# Patient Record
Sex: MALE | Race: WHITE | Employment: OTHER | URBAN - METROPOLITAN AREA
[De-identification: names, ages, dates, MRNs, and addresses within clinical notes are randomized per-mention and may not be internally consistent; named-entity substitution may affect disease eponyms.]

---

## 2023-10-05 ENCOUNTER — HOSPITAL ENCOUNTER (EMERGENCY)
Facility: HOSPITAL | Age: 68
Discharge: HOME | End: 2023-10-05
Attending: EMERGENCY MEDICINE
Payer: MEDICARE

## 2023-10-05 ENCOUNTER — OFFICE VISIT (OUTPATIENT)
Age: 68
End: 2023-10-05

## 2023-10-05 VITALS
SYSTOLIC BLOOD PRESSURE: 130 MMHG | HEIGHT: 71 IN | DIASTOLIC BLOOD PRESSURE: 80 MMHG | HEART RATE: 56 BPM | BODY MASS INDEX: 27.16 KG/M2 | RESPIRATION RATE: 18 BRPM | OXYGEN SATURATION: 100 % | WEIGHT: 194 LBS | TEMPERATURE: 98 F

## 2023-10-05 DIAGNOSIS — E11.65 HYPERGLYCEMIA DUE TO DIABETES MELLITUS (HCC): Primary | ICD-10-CM

## 2023-10-05 DIAGNOSIS — E11.65 TYPE 2 DIABETES MELLITUS WITH HYPERGLYCEMIA, WITHOUT LONG-TERM CURRENT USE OF INSULIN (HCC): Primary | ICD-10-CM

## 2023-10-05 LAB
ANION GAP BLD CALC-SCNC: 13
CA-I BLD-MCNC: 1.21 MMOL/L (ref 1.12–1.32)
CHLORIDE BLD-SCNC: 97 MMOL/L (ref 98–107)
CO2 BLD-SCNC: 27 MMOL/L
CREAT UR-MCNC: 0.58 MG/DL (ref 0.6–1.3)
GLUCOSE BLD STRIP.AUTO-MCNC: 314 MG/DL (ref 65–100)
GLUCOSE BLD STRIP.AUTO-MCNC: 435 MG/DL (ref 65–100)
GLUCOSE BLD STRIP.AUTO-MCNC: 438 MG/DL (ref 65–100)
PERFORMED BY:: ABNORMAL
PERFORMED BY:: ABNORMAL
POTASSIUM BLD-SCNC: 4.3 MMOL/L (ref 3.5–5.5)
SODIUM BLD-SCNC: 136 MMOL/L (ref 136–145)

## 2023-10-05 PROCEDURE — 80047 BASIC METABLC PNL IONIZED CA: CPT

## 2023-10-05 PROCEDURE — 6370000000 HC RX 637 (ALT 250 FOR IP): Performed by: EMERGENCY MEDICINE

## 2023-10-05 PROCEDURE — 82962 GLUCOSE BLOOD TEST: CPT

## 2023-10-05 PROCEDURE — 96374 THER/PROPH/DIAG INJ IV PUSH: CPT

## 2023-10-05 PROCEDURE — 96361 HYDRATE IV INFUSION ADD-ON: CPT

## 2023-10-05 PROCEDURE — 99284 EMERGENCY DEPT VISIT MOD MDM: CPT

## 2023-10-05 PROCEDURE — 2580000003 HC RX 258: Performed by: EMERGENCY MEDICINE

## 2023-10-05 RX ORDER — LISINOPRIL 5 MG/1
5 TABLET ORAL
COMMUNITY
Start: 2023-09-19

## 2023-10-05 RX ORDER — METFORMIN HYDROCHLORIDE 500 MG/1
TABLET, EXTENDED RELEASE ORAL
COMMUNITY
Start: 2023-09-19

## 2023-10-05 RX ORDER — 0.9 % SODIUM CHLORIDE 0.9 %
1000 INTRAVENOUS SOLUTION INTRAVENOUS
Status: COMPLETED | OUTPATIENT
Start: 2023-10-05 | End: 2023-10-05

## 2023-10-05 RX ORDER — ROSUVASTATIN CALCIUM 20 MG/1
20 TABLET, COATED ORAL
COMMUNITY
Start: 2023-09-19

## 2023-10-05 RX ADMIN — SODIUM CHLORIDE 1000 ML: 9 INJECTION, SOLUTION INTRAVENOUS at 15:07

## 2023-10-05 RX ADMIN — INSULIN HUMAN 8 UNITS: 100 INJECTION, SOLUTION PARENTERAL at 15:39

## 2023-10-05 ASSESSMENT — LIFESTYLE VARIABLES
HOW MANY STANDARD DRINKS CONTAINING ALCOHOL DO YOU HAVE ON A TYPICAL DAY: 1 OR 2
HOW OFTEN DO YOU HAVE A DRINK CONTAINING ALCOHOL: 4 OR MORE TIMES A WEEK

## 2023-10-05 ASSESSMENT — PAIN - FUNCTIONAL ASSESSMENT
PAIN_FUNCTIONAL_ASSESSMENT: NONE - DENIES PAIN
PAIN_FUNCTIONAL_ASSESSMENT: NONE - DENIES PAIN

## 2023-10-05 NOTE — ED PROVIDER NOTES
[facility] EMERGENCY DEPT  EMERGENCY DEPARTMENT HISTORY AND PHYSICAL EXAM      Date: 10/5/2023  Patient Name: Akil Le  MRN: [id number]  [address] [facility] 1955  Date of evaluation: 10/5/2023  Provider: Liss Her MD   Note Started: 2:42 PM EDT 10/5/23    HISTORY OF PRESENT ILLNESS     Chief Complaint   Patient presents with    Hyperglycemia       History Provided By: Patient    HPI: Akil Le is a 70 y.o. male presents to the emergency department complaint of asymptomatic hyperglycemia. Patient reports recent diagnosis of diabetes which she has been started on metformin. Patient states he went to his nutritionist today and they did a blood sugar that noted the glucose to be greater than 500. Patient was sent over here for the same. Patient denies any somatic complaints at this time. PAST MEDICAL HISTORY   Past Medical History:  Past Medical History:   Diagnosis Date    Diabetes mellitus ([address])        Past Surgical History:  History reviewed. No pertinent surgical history. Family History:  History reviewed. No pertinent family history. Social History:  Social History     Tobacco Use    Smoking status: Never    Smokeless tobacco: Never       Allergies:  No Known Allergies    PCP: OLLIE Rider CNP    Current Meds:   No current facility-administered medications for this encounter.      Current Outpatient Medications   Medication Sig Dispense Refill    metFORMIN (GLUCOPHAGE-XR) 500 MG extended release tablet 1 TABLET ORALLY BEFORE BREAKFAST AND EVENING MEAL 90 DAYS      rosuvastatin (CRESTOR) 20 MG tablet Take 1 tablet by mouth      lisinopril (PRINIVIL;ZESTRIL) 5 MG tablet Take 1 tablet by mouth         Social Determinants of Health:   Social Determinants of Health     Tobacco Use: Low Risk  (10/5/2023)    Patient History     Smoking Tobacco Use: Never     Smokeless Tobacco Use: Never     Passive Exposure: Not on file   Alcohol Use: Heavy Drinker (10/5/2023)    AUDIT-C

## 2023-10-05 NOTE — PROGRESS NOTES
[facility]  Diabetes Self-Management Education & Support Program    Reason for Referral: Diabetes Education  Referral Source: Marcos Michelle, [address] [facility] requested: Steve Calvin    Mr. Robinson Diamond was recently diagnosed with Type 2 Diabetes Mellitus. He was seen in my office for DSMES Intake. During the intake, this educator preform FS. Results: 506 mg/dL. Mr. Robinson Diamond was given 18 oz of water to drink. This educator contacted [address] [place] [address] director re: participants elevated FS. It was suggested that he be seen by either urgent care or emergency room to assess for acidosis and/or prevent ketoacidosis. Mr. Robinson Diamond shared that he was nervous and had never been to the ER before. This educator assisted him with calling his insurance to confirm urgent care coverage. He was told the either visit would have same co-payment. Mr. Robinson Diamond opted to go to nearest hospital. He was offered moral support and guided to [facility]. Current Medications: Metformin xr 500 mg 2x/d, Lisinopril, Rosuvastatin       Clinical Presentation  Floresita Castillo is a 70 y.o. White male referred for diabetes self-management education. Participant has Type 2 DM not on insulin for <1 year. Family history positive for diabetes. Patient reports not receiving DSMES services in the past. Most recent A1c value: See  for more results:    9/9/2023 A1C: 13.7%; LDL: 161 mg/dL. POC GLU: 506 mg/dL in office today. He was referred to nearest Urgent Care or Emergency Room. Follow up c DSMES encouraged. Amparo Ma RD on 10/5/2023 at 1:19 PM    I have personally reviewed the health record, including provider notes, laboratory data and current medications before making these care and education recommendations. The time spent in this effort is included in the total time.   Total minutes: 30

## 2023-10-05 NOTE — DISCHARGE INSTRUCTIONS
Thank you! Thank you for allowing me to care for you in the emergency department. It is my goal to provide you with excellent care. If you have not received excellent quality care, please ask to speak to the nurse manager. Please fill out the survey that will come to you by mail or email since we listen to your feedback! Below you will find a list of your tests from today's visit. Should you have any questions, please do not hesitate to call the emergency department. Labs  Recent Results (from the past 12 hour(s))   POCT Glucose    Collection Time: 10/05/23  2:33 PM   Result Value Ref Range    POC Glucose 435 (H) 65 - 100 mg/dL    Performed by: Aman Vang    POC CHEM 8    Collection Time: 10/05/23  3:10 PM   Result Value Ref Range    POC TCO2 27 MMOL/L    POC Glucose 438 (H) 65 - 100 mg/dL    POC Creatinine 0.58 (L) 0.6 - 1.3 MG/DL    eGFR, POC >60 >60 ml/min/1.73m2    POC Sodium 136 136 - 145 mmol/L    POC Potassium 4.3 3.5 - 5.5 mmol/L    POC Ionized Calcium 1.21 1.12 - 1.32 mmol/L    POC Chloride 97 (L) 98 - 107 MMOL/L    Anion Gap, POC 13     POCT Glucose    Collection Time: 10/05/23  4:31 PM   Result Value Ref Range    POC Glucose 314 (H) 65 - 100 mg/dL    Performed by: Bryant Shape        Radiologic Studies  No orders to display     ------------------------------------------------------------------------------------------------------------  The exam and treatment you received in the Emergency Department were for an urgent problem and are not intended as complete care. It is important that you follow-up with a doctor, nurse practitioner, or physician assistant to:  (1) confirm your diagnosis,  (2) re-evaluation of changes in your illness and treatment, and  (3) for ongoing care. Please take your discharge instructions with you when you go to your follow-up appointment. If you have any problem arranging a follow-up appointment, contact the Emergency Department.   If your symptoms become

## 2023-10-05 NOTE — ED TRIAGE NOTES
Patient has new diagnosis of diabetes. Was at doctors office and told his finger stick was 505. Denies being symptomatic today.  Finger stick in triage 435

## 2023-10-25 ENCOUNTER — TELEPHONE (OUTPATIENT)
Facility: CLINIC | Age: 68
End: 2023-10-25

## 2023-10-25 NOTE — TELEPHONE ENCOUNTER
----- Message from Taylor Hall sent at 10/23/2023 11:28 AM EDT -----  Subject: Message to Provider    QUESTIONS  Information for Provider? patient was trying to schedule a diabetes   consultation with George Coughlin  ---------------------------------------------------------------------------  --------------  [address]  [phone]; OK to leave message on voicemail  ---------------------------------------------------------------------------  --------------  SCRIPT ANSWERS  Relationship to Patient?  Self

## 2023-11-07 PROBLEM — N40.0 BPH (BENIGN PROSTATIC HYPERPLASIA): Status: ACTIVE | Noted: 2023-11-07

## 2023-11-08 ENCOUNTER — OFFICE VISIT (OUTPATIENT)
Age: 68
End: 2023-11-08

## 2023-11-08 DIAGNOSIS — E11.65 TYPE 2 DIABETES MELLITUS WITH HYPERGLYCEMIA, WITHOUT LONG-TERM CURRENT USE OF INSULIN (HCC): Primary | ICD-10-CM

## 2023-11-08 RX ORDER — ORAL SEMAGLUTIDE 3 MG/1
3 TABLET ORAL DAILY
COMMUNITY

## 2023-11-08 RX ORDER — GLIMEPIRIDE 2 MG/1
2 TABLET ORAL
COMMUNITY

## 2023-11-08 NOTE — PROGRESS NOTES
Skills(Q2) Blood Sugar Monitoring Score: 3.4  Low: Skills(Q8),Confidence(Q6) Reducing Risks Score: 3.0  Low: Skills(Q5),Confidence(Q3),Preparedness(Q4)  Problem Solving Score: 2.7  Low: Skills(Q6),Confidence(Q7) Healthy Coping Score: 3.7  Low: GAWDKG(E8) Being Active Score: 6.0  Low: Confidence(Q5),Preparedness(Q2)    Skills/Knowledge Questions  1. I know how to plan meals that have the best balance between carbohydrates, proteins and vegetables. 3  2. I know how my diabetes medications (pills, injectables and/or insulin) work in my body. 3  3. I know when to check my blood sugar if I want to see how my body responded to a meal. 4  4. I know when to check my blood sugars to determine if my medication or insulin doses are correct. 5  5. I know what to do to prevent a low blood sugar when I exercise (either before, during, or after). 2  6. When I am sick, I know what to do differently with my diabetes management. 2  7. I know how stress can affect my diabetes management. 2  8. When I look at my blood sugars over a given week, I can explain what my blood sugar pattern is. 2  9. I know what my target levels are for A1c, blood pressure and cholesterol. 6  Confidence Questions  1. I am confident that I can plan balanced meals and snacks. 4  2. I am confident that I can manage my stress. 5  3. I am confident that I can prevent a low blood sugar during or after exercise. 2  4. I am confident that the next time I eat out, I will be able to choose foods that best keep my blood sugars in target. 3  5. I am confident I can include exercise into my schedule. 6  6. I am confident that I can use my daily blood sugars to adjust my diet, my activity, and/or my insulin. 2  7. When something out of my normal routine happens, I am confident that I can problem-solve and keep my diabetes on track. 2  Preparedness Questions  1. Within the next month, I will begin to eat more balanced meals and snacks. 6  2.  Within the next month, I will

## 2023-11-13 ENCOUNTER — OFFICE VISIT (OUTPATIENT)
Age: 68
End: 2023-11-13
Payer: MEDICARE

## 2023-11-13 ENCOUNTER — TELEPHONE (OUTPATIENT)
Age: 68
End: 2023-11-13

## 2023-11-13 VITALS
DIASTOLIC BLOOD PRESSURE: 81 MMHG | RESPIRATION RATE: 14 BRPM | HEIGHT: 71 IN | WEIGHT: 204.2 LBS | SYSTOLIC BLOOD PRESSURE: 127 MMHG | OXYGEN SATURATION: 100 % | HEART RATE: 71 BPM | BODY MASS INDEX: 28.59 KG/M2

## 2023-11-13 DIAGNOSIS — N52.9 ERECTILE DYSFUNCTION, UNSPECIFIED ERECTILE DYSFUNCTION TYPE: ICD-10-CM

## 2023-11-13 DIAGNOSIS — R35.0 FREQUENCY OF MICTURITION: ICD-10-CM

## 2023-11-13 DIAGNOSIS — N41.9 PROSTATITIS, UNSPECIFIED PROSTATITIS TYPE: ICD-10-CM

## 2023-11-13 DIAGNOSIS — N40.1 BENIGN PROSTATIC HYPERPLASIA WITH LOWER URINARY TRACT SYMPTOMS, SYMPTOM DETAILS UNSPECIFIED: Primary | ICD-10-CM

## 2023-11-13 DIAGNOSIS — R39.12 WEAK URINARY STREAM: ICD-10-CM

## 2023-11-13 DIAGNOSIS — R33.9 URINARY RETENTION: ICD-10-CM

## 2023-11-13 LAB
BILIRUBIN, URINE, POC: NEGATIVE
BLOOD URINE, POC: NEGATIVE
GLUCOSE URINE, POC: 500
KETONES, URINE, POC: NORMAL
LEUKOCYTE ESTERASE, URINE, POC: NEGATIVE
NITRITE, URINE, POC: NEGATIVE
PH, URINE, POC: 5.5 (ref 4.6–8)
PROTEIN,URINE, POC: NEGATIVE
SPECIFIC GRAVITY, URINE, POC: 1.02 (ref 1–1.03)
URINALYSIS CLARITY, POC: CLEAR
URINALYSIS COLOR, POC: YELLOW
UROBILINOGEN, POC: NORMAL

## 2023-11-13 PROCEDURE — 51798 US URINE CAPACITY MEASURE: CPT | Performed by: UROLOGY

## 2023-11-13 PROCEDURE — 1036F TOBACCO NON-USER: CPT | Performed by: UROLOGY

## 2023-11-13 PROCEDURE — G8484 FLU IMMUNIZE NO ADMIN: HCPCS | Performed by: UROLOGY

## 2023-11-13 PROCEDURE — 81003 URINALYSIS AUTO W/O SCOPE: CPT | Performed by: UROLOGY

## 2023-11-13 PROCEDURE — 1123F ACP DISCUSS/DSCN MKR DOCD: CPT | Performed by: UROLOGY

## 2023-11-13 PROCEDURE — G8419 CALC BMI OUT NRM PARAM NOF/U: HCPCS | Performed by: UROLOGY

## 2023-11-13 PROCEDURE — 99204 OFFICE O/P NEW MOD 45 MIN: CPT | Performed by: UROLOGY

## 2023-11-13 PROCEDURE — G8427 DOCREV CUR MEDS BY ELIG CLIN: HCPCS | Performed by: UROLOGY

## 2023-11-13 PROCEDURE — 3017F COLORECTAL CA SCREEN DOC REV: CPT | Performed by: UROLOGY

## 2023-11-13 RX ORDER — ALFUZOSIN HYDROCHLORIDE 10 MG/1
10 TABLET, EXTENDED RELEASE ORAL DAILY
Qty: 30 TABLET | Refills: 3 | Status: SHIPPED | OUTPATIENT
Start: 2023-11-13

## 2023-11-13 RX ORDER — TADALAFIL 20 MG/1
20 TABLET ORAL PRN
Qty: 30 TABLET | Refills: 1 | Status: SHIPPED | OUTPATIENT
Start: 2023-11-13 | End: 2023-11-13

## 2023-11-13 RX ORDER — CIPROFLOXACIN 500 MG/1
500 TABLET, FILM COATED ORAL 2 TIMES DAILY
Qty: 30 TABLET | Refills: 1 | Status: SHIPPED | OUTPATIENT
Start: 2023-11-13 | End: 2023-11-28

## 2023-11-13 RX ORDER — TADALAFIL 20 MG/1
20 TABLET ORAL PRN
Qty: 30 TABLET | Refills: 1 | Status: SHIPPED | OUTPATIENT
Start: 2023-11-13 | End: 2023-12-13

## 2023-11-13 NOTE — PROGRESS NOTES
Byron Clark is a 70 y.o. male here for   Chief Complaint   Patient presents with    New Patient     Referred by Nicolle Maldonado CNP for benign prostatic hyperplasia with lower urinary tract symptoms       All medication reviewed. Vitals:    11/13/23 1346   BP: 127/81   Site: Left Upper Arm   Position: Sitting   Cuff Size: Medium Adult   Pulse: 71   Resp: 14   SpO2: 100%   Weight: 92.6 kg (204 lb 3.2 oz)   Height: 1.803 m (5' 11\")        1. Have you been to the ER, urgent care clinic since your last visit? Hospitalized since your last visit? -    2. Have you seen or consulted any other health care providers outside of the [address] [place] [address] since your last visit?   Include any pap smears or colon screening.-Seen by PCP

## 2023-11-13 NOTE — TELEPHONE ENCOUNTER
Pt wants his Cialis medication sent to [facility] in [facility] since it is cheaper there

## 2023-11-13 NOTE — PROGRESS NOTES
HISTORY OF PRESENT ILLNESS  Shereen Gagnon is a 70 y.o. male   Patient here today with symptoms of prostatitis. He has the following LUTS: urgency, frequency, dysuria, pain or discomfort in the scrotum or testes or groin. He does also report sexual dysfunction or ED. Patient spends a considerable amount of time in a vehicle. He reports high-normal or high levels of stress or anxiety. He consumes caffeine. Pvr 265 cc  1. Benign prostatic hyperplasia with lower urinary tract symptoms, symptom details unspecified  -     AMB POC URINALYSIS DIP STICK AUTO W/O MICRO  2. Erectile dysfunction, unspecified erectile dysfunction type  -     tadalafil (CIALIS) 20 MG tablet; Take 1 tablet by mouth as needed for Erectile Dysfunction, Disp-30 tablet, R-1Normal  3. Frequency of micturition  4. Prostatitis, unspecified prostatitis type  -     ciprofloxacin (CIPRO) 500 MG tablet; Take 1 tablet by mouth 2 times daily for 15 days, Disp-30 tablet, R-1Normal  5. Weak urinary stream  -     alfuzosin (UROXATRAL) 10 MG extended release tablet; Take 1 tablet by mouth daily, Disp-30 tablet, R-3Normal  6. Urinary retention        PAST MEDICAL HISTORY  PMHx (including negatives):  has a past medical history of Diabetes mellitus ([address] PSurgHx:  has no past surgical history on file. PSocHx:  reports that he has never smoked. He has never used smokeless tobacco.   FamilyHX: No family history on file. ROS  Review of Systems   Constitutional:  Positive for activity change. Genitourinary:  Positive for decreased urine volume. All other systems reviewed and are negative. PHYSICAL EXAM  Physical Exam  Constitutional:       General: No acute distress. Appearance: Normal appearance. Not toxic-appearing. HENT:      Head: Normocephalic and atraumatic. Nose: Nose normal.      Mouth/Throat:      Mouth: Mucous membranes are moist.   Eyes:      Extraocular Movements: Extraocular movements intact.       Pupils:

## 2023-11-14 LAB — PVR, POC: NORMAL CC

## 2023-11-28 ENCOUNTER — TELEPHONE (OUTPATIENT)
Age: 68
End: 2023-11-28

## 2023-11-28 RX ORDER — ALFUZOSIN HYDROCHLORIDE 10 MG/1
10 TABLET, EXTENDED RELEASE ORAL DAILY
Qty: 30 TABLET | Refills: 3 | Status: SHIPPED | OUTPATIENT
Start: 2023-11-28

## 2023-12-13 ENCOUNTER — OFFICE VISIT (OUTPATIENT)
Age: 68
End: 2023-12-13
Payer: MEDICARE

## 2023-12-13 VITALS
HEART RATE: 72 BPM | BODY MASS INDEX: 28.28 KG/M2 | OXYGEN SATURATION: 99 % | HEIGHT: 71 IN | WEIGHT: 202 LBS | DIASTOLIC BLOOD PRESSURE: 72 MMHG | SYSTOLIC BLOOD PRESSURE: 136 MMHG

## 2023-12-13 DIAGNOSIS — N52.9 ERECTILE DYSFUNCTION, UNSPECIFIED ERECTILE DYSFUNCTION TYPE: ICD-10-CM

## 2023-12-13 DIAGNOSIS — N40.1 BENIGN PROSTATIC HYPERPLASIA WITH URINARY FREQUENCY: Primary | ICD-10-CM

## 2023-12-13 DIAGNOSIS — R39.12 WEAK URINARY STREAM: ICD-10-CM

## 2023-12-13 DIAGNOSIS — N41.9 PROSTATITIS, UNSPECIFIED PROSTATITIS TYPE: ICD-10-CM

## 2023-12-13 DIAGNOSIS — R35.0 BENIGN PROSTATIC HYPERPLASIA WITH URINARY FREQUENCY: Primary | ICD-10-CM

## 2023-12-13 LAB
BILIRUBIN, URINE, POC: NEGATIVE
BLOOD URINE, POC: NEGATIVE
GLUCOSE URINE, POC: 100
KETONES, URINE, POC: ABNORMAL
LEUKOCYTE ESTERASE, URINE, POC: NEGATIVE
NITRITE, URINE, POC: NEGATIVE
PH, URINE, POC: 6 (ref 4.6–8)
PROTEIN,URINE, POC: NEGATIVE
PVR, POC: NORMAL CC
SPECIFIC GRAVITY, URINE, POC: 1.02 (ref 1–1.03)
URINALYSIS CLARITY, POC: CLEAR
URINALYSIS COLOR, POC: YELLOW
UROBILINOGEN, POC: ABNORMAL

## 2023-12-13 PROCEDURE — G8484 FLU IMMUNIZE NO ADMIN: HCPCS | Performed by: UROLOGY

## 2023-12-13 PROCEDURE — 51798 US URINE CAPACITY MEASURE: CPT | Performed by: UROLOGY

## 2023-12-13 PROCEDURE — 81003 URINALYSIS AUTO W/O SCOPE: CPT | Performed by: UROLOGY

## 2023-12-13 PROCEDURE — 1123F ACP DISCUSS/DSCN MKR DOCD: CPT | Performed by: UROLOGY

## 2023-12-13 PROCEDURE — G8427 DOCREV CUR MEDS BY ELIG CLIN: HCPCS | Performed by: UROLOGY

## 2023-12-13 PROCEDURE — 99214 OFFICE O/P EST MOD 30 MIN: CPT | Performed by: UROLOGY

## 2023-12-13 PROCEDURE — 1036F TOBACCO NON-USER: CPT | Performed by: UROLOGY

## 2023-12-13 PROCEDURE — G8419 CALC BMI OUT NRM PARAM NOF/U: HCPCS | Performed by: UROLOGY

## 2023-12-13 PROCEDURE — 3017F COLORECTAL CA SCREEN DOC REV: CPT | Performed by: UROLOGY

## 2023-12-13 NOTE — PROGRESS NOTES
HISTORY OF PRESENT ILLNESS  Ghislaine Landry is a 70 y.o. male   He is voiding better his residual has dropped down. He is tolerating Uroxatrol. He is not getting up as much at. Night. He is waking up with erections and is very happy that regard. He does not want retrograde ejaculation  1. Benign prostatic hyperplasia with urinary frequency  Overview:  PSA 9/1/23: 0.8  Orders:  -     AMB POC URINALYSIS DIP STICK AUTO W/O MICRO  -     AMB POC PVR, VINICIUS,POST-VOID RES,US,NON-IMAGING  2. Prostatitis, unspecified prostatitis type  Overview:  Managed on antibiotic therapy. Orders:  -     AMB POC PVR, VINICIUS,POST-VOID RES,US,NON-IMAGING  3. Erectile dysfunction, unspecified erectile dysfunction type  Overview: On Cialis. 4. Weak urinary stream  Overview: On Alfuzosin. Orders:  -     AMB POC URINALYSIS DIP STICK AUTO W/O MICRO    He is stream is improving his residuals down to 1 84-50 we will give him some more time for things to equilibrate and see him back after the holidays make a determination if we need to change his medications    PAST MEDICAL HISTORY  PMHx (including negatives):  has a past medical history of Diabetes mellitus ([address] PSurgHx:  has no past surgical history on file. PSocHx:  reports that he has never smoked. He has never used smokeless tobacco. He reports current alcohol use of about 4.0 standard drinks of alcohol per week. He reports that he does not use drugs. FamilyHX:   Family History   Problem Relation Age of Onset    Diabetes type 2  Mother        ROS  Review of Systems   Genitourinary:  Positive for decreased urine volume. All other systems reviewed and are negative. Ipss-20    PHYSICAL EXAM  Physical Exam  Constitutional:       General: No acute distress. Appearance: Normal appearance. Not toxic-appearing. HENT:      Head: Normocephalic and atraumatic.       Nose: Nose normal.      Mouth/Throat:      Mouth: Mucous membranes are moist.   Eyes:      Extraocular Movements:

## 2024-01-02 ENCOUNTER — TELEPHONE (OUTPATIENT)
Age: 69
End: 2024-01-02

## 2024-01-02 RX ORDER — ALFUZOSIN HYDROCHLORIDE 10 MG/1
10 TABLET, EXTENDED RELEASE ORAL DAILY
Qty: 90 TABLET | Refills: 3 | Status: SHIPPED | OUTPATIENT
Start: 2024-01-02

## 2024-01-02 NOTE — TELEPHONE ENCOUNTER
Pt called stating he will run out of his Rx (Alfuzosin 10mg) in the next few days and needs a refill. He also asked if he could get a 90 day supply instead of 30.

## 2024-01-02 NOTE — TELEPHONE ENCOUNTER
OFFICE VISIT      Patient: Ruddy Barnes Date of Service: 5/10/2022   : 1984 MRN: [id number]     SUBJECTIVE:     Chief Complaint   Patient presents with   • Sore Throat     Patient states on  he began with sore throat    • Congestion     onset since     • Cough   • fatigue     feeling very tired    • Exposure Coronavirus (Covid-19)     covid exposure at work. He was around her she had no mask on but patient did        HISTORY OF PRESENT ILLNESS:  Ruddy Barnes is a 37 year old male who presents today for nasal congestion, cough, runny nose, sore throat x 2 days. Coworker tested positive for covid. Vaccinated for covid.   Reports feeling warm but no fevers. Normal appetite.   Tylenol cold and mucinex with no relief.     PAST MEDICAL HISTORY:  No past medical history on file.    MEDICATIONS:  Current Outpatient Medications   Medication Sig   • amoxicillin (AMOXIL) 500 MG tablet Take 1 tablet by mouth 2 times daily for 10 days.   • fluticasone (FLONASE) 50 MCG/ACT nasal spray Spray 2 sprays in each nostril daily for 7 days.   • omeprazole (PriLOSEC) 40 MG capsule Take 1 capsule by mouth daily.   • montelukast (SINGULAIR) 10 MG tablet Take 1 tablet by mouth nightly.   • benzonatate (TESSALON PERLES) 200 MG capsule Take 1 capsule by mouth 3 times daily as needed for Cough.     No current facility-administered medications for this visit.       ALLERGIES:  ALLERGIES:  No Known Allergies    PAST SURGICAL HISTORY:  Past Surgical History:   Procedure Laterality Date   • Knee surgery         FAMILY HISTORY:  Family History   Problem Relation Age of Onset   • Patient is unaware of any medical problems Mother    • Patient is unaware of any medical problems Father        SOCIAL HISTORY:  Social History     Tobacco Use   • Smoking status: Never Smoker   • Smokeless tobacco: Never Used   Vaping Use   • Vaping Use: never used   Substance Use Topics   • Alcohol use: Yes   • Drug use: Never       Review of Systems  Rx sent     Constitutional: Negative.    HENT: Positive for congestion, rhinorrhea and sore throat.    Eyes: Negative.    Respiratory: Positive for cough.    Cardiovascular: Negative.    Gastrointestinal: Negative.    Genitourinary: Negative.    Musculoskeletal: Negative.    Skin: Negative.    Neurological: Negative.    Psychiatric/Behavioral: Negative.          OBJECTIVE:     Vitals:    05/10/22 1250   BP: (!) 137/96   BP Location: LUE - Left upper extremity   Patient Position: Sitting   Cuff Size: Large Adult   Pulse: 73   Resp: 18   Temp: 97 °F (36.1 °C)   TempSrc: Tympanic   SpO2: 99%       Physical Exam  Vitals and nursing note reviewed.   Constitutional:       Appearance: He is ill-appearing.   HENT:      Right Ear: Tympanic membrane, ear canal and external ear normal.      Left Ear: Tympanic membrane, ear canal and external ear normal.      Nose: Mucosal edema, congestion and rhinorrhea present.      Right Sinus: No maxillary sinus tenderness or frontal sinus tenderness.      Left Sinus: No maxillary sinus tenderness or frontal sinus tenderness.      Mouth/Throat:      Mouth: Mucous membranes are moist.      Pharynx: Oropharynx is clear. Posterior oropharyngeal erythema present. No oropharyngeal exudate.      Neck: Neck supple. No tenderness.   Cardiovascular:      Rate and Rhythm: Normal rate and regular rhythm.      Heart sounds: Normal heart sounds.   Pulmonary:      Effort: Pulmonary effort is normal.      Breath sounds: Normal breath sounds.   Musculoskeletal:         General: Normal range of motion.   Lymphadenopathy:      Cervical: Cervical adenopathy present.   Skin:     General: Skin is warm.      Capillary Refill: Capillary refill takes less than 2 seconds.   Neurological:      General: No focal deficit present.      Mental Status: He is alert and oriented to person, place, and time.   Psychiatric:         Mood and Affect: Mood normal.         Behavior: Behavior normal.         Thought Content: Thought  content normal.         Judgment: Judgment normal.         DIAGNOSTIC STUDIES:   LAB RESULTS:  Results for orders placed or performed in visit on 05/10/22   POCT RAPID STREP A   Result Value Ref Range    GRP A STREP Positive (A) Negative    Internal Procedural Controls Acceptable Yes          Orders Placed This Encounter   • 2019 Novel Coronavirus (SARS-CoV-2)   • POCT Rapid strep A   • amoxicillin (AMOXIL) 500 MG tablet   • fluticasone (FLONASE) 50 MCG/ACT nasal spray         Assessment AND PLAN:   This is a 37 year old year-old male who presents with:    1. Strep pharyngitis        Rapid strep +  Covid pcr pending-Quarantine until we have results  Start on amox bid x 10 days  Take medication as indicated in orders  If taking antibiotics complete treatment, do not stop taking if you feel better, this may cause more harm or may lead to treatment failure  Rest  Stay well hydrated, drink plenty of water  May take Tylenol or Ibuprofen for fever or aches  Follow up with PCP if symptoms do not improve in 1 week    Instructions provided as documented in the AVS.          The patient indicates understanding of the diagnosis and agreed with the plan of care.

## 2024-01-28 DIAGNOSIS — N41.9 PROSTATITIS, UNSPECIFIED PROSTATITIS TYPE: ICD-10-CM

## 2024-02-02 RX ORDER — CIPROFLOXACIN 500 MG/1
500 TABLET, FILM COATED ORAL 2 TIMES DAILY
Qty: 30 TABLET | Refills: 1 | OUTPATIENT
Start: 2024-02-02 | End: 2024-02-17

## 2024-03-05 ENCOUNTER — OFFICE VISIT (OUTPATIENT)
Age: 69
End: 2024-03-05
Payer: MEDICARE

## 2024-03-05 VITALS — SYSTOLIC BLOOD PRESSURE: 148 MMHG | HEART RATE: 82 BPM | DIASTOLIC BLOOD PRESSURE: 88 MMHG

## 2024-03-05 DIAGNOSIS — N40.1 BENIGN PROSTATIC HYPERPLASIA WITH URINARY FREQUENCY: ICD-10-CM

## 2024-03-05 DIAGNOSIS — N52.9 ERECTILE DYSFUNCTION, UNSPECIFIED ERECTILE DYSFUNCTION TYPE: ICD-10-CM

## 2024-03-05 DIAGNOSIS — N40.1 BENIGN PROSTATIC HYPERPLASIA WITH LOWER URINARY TRACT SYMPTOMS, SYMPTOM DETAILS UNSPECIFIED: Primary | ICD-10-CM

## 2024-03-05 DIAGNOSIS — R39.12 WEAK URINARY STREAM: ICD-10-CM

## 2024-03-05 DIAGNOSIS — R35.0 BENIGN PROSTATIC HYPERPLASIA WITH URINARY FREQUENCY: ICD-10-CM

## 2024-03-05 DIAGNOSIS — N41.9 PROSTATITIS, UNSPECIFIED PROSTATITIS TYPE: ICD-10-CM

## 2024-03-05 LAB
BILIRUBIN, URINE, POC: NEGATIVE
BLOOD URINE, POC: NEGATIVE
GLUCOSE URINE, POC: 100
KETONES, URINE, POC: NEGATIVE
LEUKOCYTE ESTERASE, URINE, POC: NEGATIVE
NITRITE, URINE, POC: NEGATIVE
PH, URINE, POC: 6.5 (ref 4.6–8)
PROTEIN,URINE, POC: NORMAL
PVR, POC: NORMAL CC
SPECIFIC GRAVITY, URINE, POC: 1.02 (ref 1–1.03)
URINALYSIS CLARITY, POC: CLEAR
URINALYSIS COLOR, POC: YELLOW
UROBILINOGEN, POC: NORMAL

## 2024-03-05 PROCEDURE — G8428 CUR MEDS NOT DOCUMENT: HCPCS | Performed by: UROLOGY

## 2024-03-05 PROCEDURE — G8419 CALC BMI OUT NRM PARAM NOF/U: HCPCS | Performed by: UROLOGY

## 2024-03-05 PROCEDURE — 1123F ACP DISCUSS/DSCN MKR DOCD: CPT | Performed by: UROLOGY

## 2024-03-05 PROCEDURE — 51798 US URINE CAPACITY MEASURE: CPT | Performed by: UROLOGY

## 2024-03-05 PROCEDURE — 99214 OFFICE O/P EST MOD 30 MIN: CPT | Performed by: UROLOGY

## 2024-03-05 PROCEDURE — 1036F TOBACCO NON-USER: CPT | Performed by: UROLOGY

## 2024-03-05 PROCEDURE — 81003 URINALYSIS AUTO W/O SCOPE: CPT | Performed by: UROLOGY

## 2024-03-05 PROCEDURE — G8484 FLU IMMUNIZE NO ADMIN: HCPCS | Performed by: UROLOGY

## 2024-03-05 PROCEDURE — 3017F COLORECTAL CA SCREEN DOC REV: CPT | Performed by: UROLOGY

## 2024-03-05 RX ORDER — AMLODIPINE BESYLATE 5 MG/1
5 TABLET ORAL DAILY
COMMUNITY

## 2024-03-05 RX ORDER — ORAL SEMAGLUTIDE 7 MG/1
TABLET ORAL
COMMUNITY
Start: 2024-01-30

## 2024-03-05 NOTE — PROGRESS NOTES
Chief Complaint   Patient presents with    Follow-up    Benign Prostatic Hypertrophy    Urinary Catheter    Kidney Cyst/Mass        BP (!) 148/88   Pulse 82      PHQ-9 score is    Negative      1. \"Have you been to the ER, urgent care clinic since your last visit?  Hospitalized since your last visit?\" No    2. \"Have you seen or consulted any other health care providers outside of the [facility] since your last visit?\" No     3. For patients aged 45-75: Has the patient had a colonoscopy / FIT/ Cologuard? Yes - no Care Gap present      If the patient is female:    4. For patients aged 40-74: Has the patient had a mammogram within the past 2 years? NA - based on age or sex      5. For patients aged 21-65: Has the patient had a pap smear? NA - based on age or sex

## 2024-03-05 NOTE — PROGRESS NOTES
HISTORY OF PRESENT ILLNESS  Ole Orona is a 68 y.o. male   Patient came in to talk about his medications.  He went off his Uroxatrol by mistake for 3 days and knows the real difficulty in voiding.  He went back on it and he is voiding much better.  Additionally Cialis is working.  He is happy with that.  His stream is not superstrong but it is moderate he empties his bladder well.  So not going to change medication and go to surgery at this point.  He is dealing with his diabetes and recently broke his foot which took him out of work for about 6 to 8 weeks.  1. Benign prostatic hyperplasia with lower urinary tract symptoms, symptom details unspecified  Overview:  PSA 9/1/23: 0.8  Orders:  -     AMB POC URINALYSIS DIP STICK AUTO W/O MICRO  -     AMB POC PVR, VINICIUS,POST-VOID RES,US,NON-IMAGING  2. Prostatitis, unspecified prostatitis type  Overview:  Managed on antibiotic therapy.  3. Erectile dysfunction, unspecified erectile dysfunction type  Overview:  On Cialis.  4. Benign prostatic hyperplasia with urinary frequency  Overview:  PSA 9/1/23: 0.8  5. Weak urinary stream  Overview:  On Alfuzosin.        PAST MEDICAL HISTORY  PMHx (including negatives):  has a past medical history of Diabetes mellitus (HCC).   PSurgHx:  has no past surgical history on file.  PSocHx:  reports that he has never smoked. He has never used smokeless tobacco. He reports current alcohol use of about 4.0 standard drinks of alcohol per week. He reports that he does not use drugs.   FamilyHX:   Family History   Problem Relation Age of Onset    Diabetes type 2  Mother    Pvr today is 86 cc    ROS  Review of Systems   Constitutional:  Positive for activity change.   Musculoskeletal:  Positive for joint swelling.   All other systems reviewed and are negative.        PHYSICAL EXAM  Physical Exam  Constitutional:       General: No acute distress.     Appearance: Normal appearance.  Not toxic-appearing.   HENT:      Head: Normocephalic and

## 2024-05-10 ENCOUNTER — NURSE ONLY (OUTPATIENT)
Age: 69
End: 2024-05-10

## 2024-05-10 DIAGNOSIS — E11.9 TYPE 2 DIABETES MELLITUS WITHOUT COMPLICATION, WITHOUT LONG-TERM CURRENT USE OF INSULIN (HCC): Primary | ICD-10-CM

## 2024-05-10 NOTE — PROGRESS NOTES
[facility]  Diabetes Self-Management Education & Support Program  Encounter Note      SUMMARY  Diabetes self-care management training was completed related to reducing risks. The participant will return on May 17 to continue DSMES related to healthy eating and monitoring. The participant did identify SMART Goal(s) and will practice knowledge and skills related to reducing risks to improve diabetes self-management.        EVALUATION:  Participant was engaged in learning and shared in group discussions.  He shared that his A1c has come down from 15 to 6.5.  He was able to identify his personal care team.  He is up to date on vaccines as well as annual eye exam.  He needs dental and foot exams.      RECOMMENDATIONS:  Continue DSMES classes  Make recommended appointments     TOPICS DISCUSSED TODAY:  WHAT IS DIABETES? Minutes: 60  HOW DO I STAY HEALTHY? 60      Next provider visit is scheduled for 5/17/24       SMART GOAL(S)   Drink at least 64 oz of water daily X 7 days  ACHIEVEMENT OF GOAL(S) : 0-24%         DATE DSMES TOPIC EVALUATION     5/10/2024 WHAT IS DIABETES?   Role of the normal pancreas in energy balance and blood glucose control   The defect seen in diabetes   Signs & symptoms of diabetes   Diagnosis of diabetes   Types of diabetes   Blood glucose targets in non-pregnant & non-pregnant adults       The participant knows  Their type of diabetes: Yes   The basic physiologic defect: Yes  Blood glucose targets: Yes     DATE DSMES TOPIC EVALUATION     5/10/2024 HOW DO I STAY HEALTHY?   Prevention   Vaccinations   Preconception care (if applicable)  Examinations   Eye    Foot   Diabetic complications' prevention   Dental health   Heart health   Kidney Health   Nerve health   Sleep health      The participant has a personal diabetes care record to keep abreast of diabetes health Yes            JOSEFINA MORIN RN on 5/10/2024 at 12:05 PM    I have personally reviewed the health record,

## 2024-05-13 ENCOUNTER — TELEPHONE (OUTPATIENT)
Age: 69
End: 2024-05-13

## 2024-05-15 RX ORDER — TADALAFIL 20 MG/1
20 TABLET ORAL PRN
Qty: 30 TABLET | Refills: 1 | Status: SHIPPED | OUTPATIENT
Start: 2024-05-15 | End: 2025-05-15

## 2024-05-17 ENCOUNTER — NURSE ONLY (OUTPATIENT)
Age: 69
End: 2024-05-17
Payer: MEDICARE

## 2024-05-17 DIAGNOSIS — E11.9 TYPE 2 DIABETES MELLITUS WITHOUT COMPLICATION, WITHOUT LONG-TERM CURRENT USE OF INSULIN (HCC): Primary | ICD-10-CM

## 2024-05-17 PROCEDURE — G0109 DIAB MANAGE TRN IND/GROUP: HCPCS

## 2024-05-17 NOTE — PROGRESS NOTES
[facility]  Diabetes Self-Management Education & Support Program  Encounter Note      SUMMARY  Diabetes self-care management training was completed related to healthy eating and monitoring. The participant will return on May 24 to continue DSMES related to taking medications and physical activity. The participant did identify SMART Goal(s) and will practice knowledge and skills related to reducing risks and healthy eating and monitoring to improve diabetes self-management.        EVALUATION:  Participant was engaged in learning and shared in group discussions.  He shared that a normal breakfast for him is cereal with fruit.  He realized that he was not having a protein source and only carbs for breakfast once we completed healthy eating.  He was able to read food labels and count carbohydrates with accuracy.  Verbalized proper technique with blood glucose monitoring.   He shared that he starting walking again after healing from a  broken foot.  He is walking 2 miles per day.    RECOMMENDATIONS:  Continue DSMES classes  Add protein source to breakfast meal     TOPICS DISCUSSED TODAY:  WHAT CAN I EAT? 60  HOW CAN BLOOD GLUCOSE MONITORING HELP ME? 60      Next provider visit is scheduled for 5/24/24       SMART GOAL(S)   Monitor 2 hours after a meal for 5 days over the next 7 days  ACHIEVEMENT OF GOAL(S) : 0-24%         DATE DSMES TOPIC EVALUATION     5/17/2024 WHAT CAN I EAT?   General principles   Determining a healthy weight   Nutritional terms & tools   Healthy Plate method   Carbohydrate Counting   Reading food labels   Free apps   Pregnancy recommendations      The participant   Uses Healthy Plate principles in constructing meals: Yes  Reads food labels in choosing acceptable foods: Yes         DATE DSMES TOPIC EVALUATION     5/17/2024 HOW CAN BLOOD GLUCOSE MONITORING HELP ME?   Value of blood glucose monitoring   Realistic expectations   Blood glucose monitoring targets   Target

## 2024-05-24 ENCOUNTER — NURSE ONLY (OUTPATIENT)
Age: 69
End: 2024-05-24
Payer: MEDICARE

## 2024-05-24 DIAGNOSIS — E11.9 TYPE 2 DIABETES MELLITUS WITHOUT COMPLICATION, WITHOUT LONG-TERM CURRENT USE OF INSULIN (HCC): Primary | ICD-10-CM

## 2024-05-24 PROCEDURE — G0109 DIAB MANAGE TRN IND/GROUP: HCPCS

## 2024-05-24 NOTE — PROGRESS NOTES
[facility]  Diabetes Self-Management Education & Support Program  Encounter Note      SUMMARY  Diabetes self-care management training was completed related to taking medications and physical activity. The participant will return on May 31 to continue DSMES related to healthy coping and problem solving. The participant did identify SMART Goal(s) and will practice knowledge and skills related to reducing risks, healthy eating and monitoring, and being active and medications to improve diabetes self-management.        EVALUATION:  Participant was engaged in group discussions.  He shared that he has been visiting local trails and walking about 2 miles.    He also shared that he is reading food labels more and is surprised by some of the carbohydrate amounts.  He reported never having low blood glucose.  Hypoglycemia covered in class today.  He participated in group chair activities.      RECOMMENDATIONS:  Continue DSMES classes  Continue label reading and carb counting     TOPICS DISCUSSED TODAY:  HOW DO MY DIABETES MEDICATIONS WORK? 60  HOW DOES PHYSICAL ACTIVITY AFFECT MY DIABETES? 60      Next provider visit is scheduled for 5/31/24       SMART GOAL(S)  Increase physical activity by walking for 30 minutes / 2 or more miles 4 times over the next week.  ACHIEVEMENT OF GOAL(S) : 0-24%         DATE DSMES TOPIC EVALUATION     5/24/2024 HOW DO MY DIABETES MEDICATIONS WORK?   Type 1 medications & methods   Insulin injections   Injection sites   Type 2 medications   Oral agents   GLP-1 agonists   Hypoglycemia symptoms & treatment   Glucagon emergency kits   General guidance regarding insulin use whether Type 1, 2 or gestational diabetes   Storage of insulin   Disposal    Traveling with medications   Barriers to medication adherence      The participant   Can describe the expected action & side effects of prescribed diabetes medications: Yes  Can demonstrate injection technique (if applicable):

## 2024-05-31 ENCOUNTER — NURSE ONLY (OUTPATIENT)
Age: 69
End: 2024-05-31
Payer: MEDICARE

## 2024-05-31 DIAGNOSIS — E11.9 TYPE 2 DIABETES MELLITUS WITHOUT COMPLICATION, WITHOUT LONG-TERM CURRENT USE OF INSULIN (HCC): Primary | ICD-10-CM

## 2024-05-31 PROCEDURE — G0109 DIAB MANAGE TRN IND/GROUP: HCPCS

## 2024-05-31 NOTE — PROGRESS NOTES
[facility]  Diabetes Self-Management Education & Support Program  Encounter Note      SUMMARY  Diabetes self-care management training was completed related to healthy coping and problem solving. The participant will return in 6 weeks to follow up on  DSMES related to reducing risks, healthy eating, monitoring, taking medications, physical activity, healthy coping, and problem solving. The participant did not identify SMART Goal(s) and will practice knowledge and skills related to reducing risks, healthy eating and monitoring, being active and medications, and healthy coping and problem solving to improve diabetes self-management.        EVALUATION:  Participant was engaged in learning and shared in group discussions.  He shared that when initially diagnosed with diabetes he was shocked but looking back he states that he had increased thirst and increased urination prior to diagnosis.  He was able to identify patterns with blood glucose monitoring and he also identified healthy coping mechanisms.  He will return in 6 weeks for follow up.    RECOMMENDATIONS:  Continue using skills obtained in DSMES classes  6 week follow up     TOPICS DISCUSSED TODAY:  HOW DO I FIND SUPPORT TO TACKLE THIS CONDITION? 60  HOW DO I FIGURE OUT WHAT'S INFLUENCING MY BLOOD GLUCOSES? 60      Next provider visit is scheduled for 6 weeks       SMART GOAL(S)  Increase physical activity by walking for 30 minutes / 2 or more miles 4 times over the next week   ACHIEVEMENT OF GOAL(S) : %    2.    Monitor 2 hours after a meal for 5 days over the next 7 days  ACHIEVEMENT OF GOAL(S) :  %    3.   Drink at least 64 oz of water daily X 7 days   ACHIEVEMENT OF GOAL(S) :  %         DATE DSMES TOPIC EVALUATION     5/31/2024 HOW DO I FIND SUPPORT TO TACKLE THIS CONDITION?   Normal responses to diabetes diagnosis or complication   Shock   Anger & resentment   Guilt/self-blame   Sadness & worry   Depression

## 2024-07-01 ENCOUNTER — TELEPHONE (OUTPATIENT)
Age: 69
End: 2024-07-01

## 2024-07-02 RX ORDER — ALFUZOSIN HYDROCHLORIDE 10 MG/1
10 TABLET, EXTENDED RELEASE ORAL DAILY
Qty: 90 TABLET | Refills: 3 | Status: SHIPPED | OUTPATIENT
Start: 2024-07-02

## 2024-07-15 ENCOUNTER — OFFICE VISIT (OUTPATIENT)
Age: 69
End: 2024-07-15
Payer: MEDICARE

## 2024-07-15 DIAGNOSIS — E11.9 TYPE 2 DIABETES MELLITUS WITHOUT COMPLICATION, WITHOUT LONG-TERM CURRENT USE OF INSULIN (HCC): Primary | ICD-10-CM

## 2024-07-15 PROCEDURE — G0108 DIAB MANAGE TRN  PER INDIV: HCPCS | Performed by: DIETITIAN, REGISTERED

## 2024-07-15 NOTE — PROGRESS NOTES
prevent hypoglycemia? Take medications as instructed    The participant knows how to treat hypoglycemia? Rule of 15    Hyperglycemia Management:  The participant knows their signs and symptoms of hyperglycemia Extreme thirst, Frequent urination    The participant knows how to prevent hyperglycemia? Take medication as instructed    Sick Day Management:  The participant knows what to do differently on sick days? Stay hydrated    Pattern Management:  The participant can notice blood glucose patterns when looking at their blood glucose readings Yes           Note: Content derived from the American Association of Diabetes Educators' Diabetes Education Curriculum: A Guide to Successful Self-Management (3rd edition)      I have personally reviewed the health record, including provider notes, laboratory data and current medications before making these care and education recommendations. The time spent in this effort is included in the total time.  Total minutes: 30      --Karina Valles RD on 7/15/2024 at 9:52 AM       Diabetes Skills, Confidence & Preparedness Index (SCPI) ©    Thank you for completing the Skills, Confidence & Preparedness Index!  Below are your scores.  All scales and questions are out of 7.  If you would like these results emailed, please enter your email address along with some identifying patient information.  Email:  Patient Identifier:      Overall SCPI score: 6.0 Skills Score: 6.0  Low: Problem Solving(Q6) Confidence Score: 5.9  Low: Reducing Risks(Q3) Preparedness Score: 6.2  Low: Being Active(Q2),Healthy Coping(Q3),Reducing Risks(Q4),Blood Sugar Monitoring(Q6),Problem Solving(Q7)   Healthy Eating Score: 6.3  Low: Skills(Q1),Confidence(Q1),Confidence(Q4) Taking Medication Score: 6.0  Low: Skills(Q2) Blood Sugar Monitoring Score: 6.2  Low: Skills(Q4),Skills(Q8),Confidence(Q6),Preparedness(Q6) Reducing Risks Score: 5.8  Low: Confidence(Q3)   Problem Solving Score: 5.7  Low: Skills(Q6) Healthy Coping

## 2024-08-11 DIAGNOSIS — N41.9 PROSTATITIS, UNSPECIFIED PROSTATITIS TYPE: ICD-10-CM

## 2024-08-15 RX ORDER — TADALAFIL 20 MG/1
20 TABLET ORAL PRN
Qty: 30 TABLET | Refills: 1 | Status: SHIPPED | OUTPATIENT
Start: 2024-08-15 | End: 2025-08-15

## 2024-08-20 RX ORDER — CIPROFLOXACIN 500 MG/1
500 TABLET, FILM COATED ORAL 2 TIMES DAILY
Qty: 30 TABLET | Refills: 1 | OUTPATIENT
Start: 2024-08-20 | End: 2024-09-04

## 2024-09-11 ENCOUNTER — OFFICE VISIT (OUTPATIENT)
Age: 69
End: 2024-09-11
Payer: MEDICARE

## 2024-09-11 VITALS — DIASTOLIC BLOOD PRESSURE: 73 MMHG | SYSTOLIC BLOOD PRESSURE: 123 MMHG | HEART RATE: 81 BPM

## 2024-09-11 DIAGNOSIS — R39.12 WEAK URINARY STREAM: ICD-10-CM

## 2024-09-11 DIAGNOSIS — N52.9 ERECTILE DYSFUNCTION, UNSPECIFIED ERECTILE DYSFUNCTION TYPE: ICD-10-CM

## 2024-09-11 DIAGNOSIS — N41.9 PROSTATITIS, UNSPECIFIED PROSTATITIS TYPE: ICD-10-CM

## 2024-09-11 DIAGNOSIS — N40.1 BENIGN PROSTATIC HYPERPLASIA WITH LOWER URINARY TRACT SYMPTOMS, SYMPTOM DETAILS UNSPECIFIED: Primary | ICD-10-CM

## 2024-09-11 LAB
BILIRUBIN, URINE, POC: NEGATIVE
BLOOD URINE, POC: NEGATIVE
GLUCOSE URINE, POC: 100
KETONES, URINE, POC: NEGATIVE
LEUKOCYTE ESTERASE, URINE, POC: NEGATIVE
NITRITE, URINE, POC: NEGATIVE
PH, URINE, POC: 5.5 (ref 4.6–8)
PROTEIN,URINE, POC: NEGATIVE
SPECIFIC GRAVITY, URINE, POC: 1.03 (ref 1–1.03)
URINALYSIS CLARITY, POC: CLEAR
URINALYSIS COLOR, POC: YELLOW
UROBILINOGEN, POC: NORMAL

## 2024-09-11 PROCEDURE — 1036F TOBACCO NON-USER: CPT | Performed by: UROLOGY

## 2024-09-11 PROCEDURE — 1123F ACP DISCUSS/DSCN MKR DOCD: CPT | Performed by: UROLOGY

## 2024-09-11 PROCEDURE — 3017F COLORECTAL CA SCREEN DOC REV: CPT | Performed by: UROLOGY

## 2024-09-11 PROCEDURE — 81003 URINALYSIS AUTO W/O SCOPE: CPT | Performed by: UROLOGY

## 2024-09-11 PROCEDURE — G8428 CUR MEDS NOT DOCUMENT: HCPCS | Performed by: UROLOGY

## 2024-09-11 PROCEDURE — G8419 CALC BMI OUT NRM PARAM NOF/U: HCPCS | Performed by: UROLOGY

## 2024-09-11 PROCEDURE — 99214 OFFICE O/P EST MOD 30 MIN: CPT | Performed by: UROLOGY

## 2024-09-11 RX ORDER — ALFUZOSIN HYDROCHLORIDE 10 MG/1
10 TABLET, EXTENDED RELEASE ORAL DAILY
Qty: 90 TABLET | Refills: 3 | Status: SHIPPED | OUTPATIENT
Start: 2024-09-11

## 2024-09-25 ENCOUNTER — PROCEDURE VISIT (OUTPATIENT)
Age: 69
End: 2024-09-25
Payer: MEDICARE

## 2024-09-25 VITALS — SYSTOLIC BLOOD PRESSURE: 122 MMHG | HEART RATE: 94 BPM | DIASTOLIC BLOOD PRESSURE: 74 MMHG

## 2024-09-25 DIAGNOSIS — R33.9 URINARY RETENTION: ICD-10-CM

## 2024-09-25 DIAGNOSIS — N40.1 BENIGN PROSTATIC HYPERPLASIA WITH LOWER URINARY TRACT SYMPTOMS, SYMPTOM DETAILS UNSPECIFIED: Primary | ICD-10-CM

## 2024-09-25 LAB
BILIRUBIN, URINE, POC: NEGATIVE
BLOOD URINE, POC: NEGATIVE
GLUCOSE URINE, POC: NEGATIVE
KETONES, URINE, POC: NEGATIVE
LEUKOCYTE ESTERASE, URINE, POC: NEGATIVE
NITRITE, URINE, POC: NEGATIVE
PH, URINE, POC: 5.5 (ref 4.6–8)
PROTEIN,URINE, POC: NEGATIVE
SPECIFIC GRAVITY, URINE, POC: 1.02 (ref 1–1.03)
URINALYSIS CLARITY, POC: CLEAR
URINALYSIS COLOR, POC: YELLOW
UROBILINOGEN, POC: NORMAL

## 2024-09-25 PROCEDURE — 1036F TOBACCO NON-USER: CPT | Performed by: UROLOGY

## 2024-09-25 PROCEDURE — 1123F ACP DISCUSS/DSCN MKR DOCD: CPT | Performed by: UROLOGY

## 2024-09-25 PROCEDURE — G8427 DOCREV CUR MEDS BY ELIG CLIN: HCPCS | Performed by: UROLOGY

## 2024-09-25 PROCEDURE — 51798 US URINE CAPACITY MEASURE: CPT | Performed by: UROLOGY

## 2024-09-25 PROCEDURE — 51741 ELECTRO-UROFLOWMETRY FIRST: CPT | Performed by: UROLOGY

## 2024-09-25 PROCEDURE — G8419 CALC BMI OUT NRM PARAM NOF/U: HCPCS | Performed by: UROLOGY

## 2024-09-25 PROCEDURE — 3017F COLORECTAL CA SCREEN DOC REV: CPT | Performed by: UROLOGY

## 2024-09-25 PROCEDURE — 52000 CYSTOURETHROSCOPY: CPT | Performed by: UROLOGY

## 2024-09-25 PROCEDURE — 51725 SIMPLE CYSTOMETROGRAM: CPT | Performed by: UROLOGY

## 2024-09-25 PROCEDURE — 99214 OFFICE O/P EST MOD 30 MIN: CPT | Performed by: UROLOGY

## 2024-09-25 PROCEDURE — 81003 URINALYSIS AUTO W/O SCOPE: CPT | Performed by: UROLOGY

## 2024-09-25 RX ORDER — CIPROFLOXACIN 500 MG/1
500 TABLET, FILM COATED ORAL ONCE
Status: COMPLETED | OUTPATIENT
Start: 2024-09-25 | End: 2024-09-27

## 2024-09-27 RX ADMIN — CIPROFLOXACIN 500 MG: 500 TABLET, FILM COATED ORAL at 13:00

## 2024-11-05 ENCOUNTER — OFFICE VISIT (OUTPATIENT)
Age: 69
End: 2024-11-05
Payer: MEDICARE

## 2024-11-05 VITALS
DIASTOLIC BLOOD PRESSURE: 71 MMHG | WEIGHT: 202 LBS | HEART RATE: 88 BPM | BODY MASS INDEX: 28.28 KG/M2 | SYSTOLIC BLOOD PRESSURE: 121 MMHG | HEIGHT: 71 IN

## 2024-11-05 DIAGNOSIS — R39.12 WEAK URINARY STREAM: ICD-10-CM

## 2024-11-05 DIAGNOSIS — R33.9 URINARY RETENTION: Primary | ICD-10-CM

## 2024-11-05 DIAGNOSIS — N52.9 ERECTILE DYSFUNCTION, UNSPECIFIED ERECTILE DYSFUNCTION TYPE: ICD-10-CM

## 2024-11-05 LAB
BILIRUBIN, URINE, POC: NEGATIVE
BLOOD URINE, POC: NEGATIVE
GLUCOSE URINE, POC: 250
KETONES, URINE, POC: NEGATIVE
LEUKOCYTE ESTERASE, URINE, POC: NEGATIVE
NITRITE, URINE, POC: NEGATIVE
PH, URINE, POC: 5.5 (ref 4.6–8)
PROTEIN,URINE, POC: ABNORMAL
PVR, POC: 56 CC
SPECIFIC GRAVITY, URINE, POC: 1.02 (ref 1–1.03)
URINALYSIS CLARITY, POC: CLEAR
URINALYSIS COLOR, POC: ABNORMAL
UROBILINOGEN, POC: ABNORMAL

## 2024-11-05 PROCEDURE — G8427 DOCREV CUR MEDS BY ELIG CLIN: HCPCS | Performed by: UROLOGY

## 2024-11-05 PROCEDURE — G8419 CALC BMI OUT NRM PARAM NOF/U: HCPCS | Performed by: UROLOGY

## 2024-11-05 PROCEDURE — 99214 OFFICE O/P EST MOD 30 MIN: CPT | Performed by: UROLOGY

## 2024-11-05 PROCEDURE — 51798 US URINE CAPACITY MEASURE: CPT | Performed by: UROLOGY

## 2024-11-05 PROCEDURE — 1159F MED LIST DOCD IN RCRD: CPT | Performed by: UROLOGY

## 2024-11-05 PROCEDURE — 1036F TOBACCO NON-USER: CPT | Performed by: UROLOGY

## 2024-11-05 PROCEDURE — 3017F COLORECTAL CA SCREEN DOC REV: CPT | Performed by: UROLOGY

## 2024-11-05 PROCEDURE — 1123F ACP DISCUSS/DSCN MKR DOCD: CPT | Performed by: UROLOGY

## 2024-11-05 PROCEDURE — G8484 FLU IMMUNIZE NO ADMIN: HCPCS | Performed by: UROLOGY

## 2024-11-05 PROCEDURE — 81003 URINALYSIS AUTO W/O SCOPE: CPT | Performed by: UROLOGY

## 2024-11-05 RX ORDER — ALFUZOSIN HYDROCHLORIDE 10 MG/1
10 TABLET, EXTENDED RELEASE ORAL 2 TIMES DAILY
Qty: 360 TABLET | Refills: 3 | Status: SHIPPED | OUTPATIENT
Start: 2024-11-05

## 2024-11-05 ASSESSMENT — PATIENT HEALTH QUESTIONNAIRE - PHQ9
SUM OF ALL RESPONSES TO PHQ QUESTIONS 1-9: 0
SUM OF ALL RESPONSES TO PHQ QUESTIONS 1-9: 0
1. LITTLE INTEREST OR PLEASURE IN DOING THINGS: NOT AT ALL
SUM OF ALL RESPONSES TO PHQ9 QUESTIONS 1 & 2: 0
SUM OF ALL RESPONSES TO PHQ QUESTIONS 1-9: 0
SUM OF ALL RESPONSES TO PHQ QUESTIONS 1-9: 0
2. FEELING DOWN, DEPRESSED OR HOPELESS: NOT AT ALL

## 2024-11-05 NOTE — PROGRESS NOTES
HISTORY OF PRESENT ILLNESS  Ole Orona is a 69 y.o. male   Patient returns today doing quite well.  He went to Uroxatrol twice a day and now he is not getting up at night stream is good residual today is 59 cc.  And we talked about just leaving it the way it is.  He has taken Cialis and that is working as well.  His PSA a year ago was 0.7  1. Urinary retention  Overview:  He spontaneously  developed urinary retention which resolved by itself  S/p cystoscopy on 9/25/2024 with findings of BPH with obstruction.   9/25/2024 -PVR was 278 cc  Last appointment possible  urolift discussed   Orders:  -     AMB POC URINALYSIS DIP STICK AUTO W/O MICRO  -     AMB POC PVR, VINICIUS,POST-VOID RES,US,NON-IMAGING  -     alfuzosin (UROXATRAL) 10 MG extended release tablet; Take 1 tablet by mouth in the morning and at bedtime, Disp-360 tablet, R-3Normal  2. Weak urinary stream  Overview:  On Alfuzosin with positive results  Orders:  -     AMB POC URINALYSIS DIP STICK AUTO W/O MICRO  -     AMB POC PVR, VINICIUS,POST-VOID RES,US,NON-IMAGING  -     alfuzosin (UROXATRAL) 10 MG extended release tablet; Take 1 tablet by mouth in the morning and at bedtime, Disp-360 tablet, R-3Normal  3. Erectile dysfunction, unspecified erectile dysfunction type  Overview:  On Cialis.  Orders:  -     AMB POC URINALYSIS DIP STICK AUTO W/O MICRO        PAST MEDICAL HISTORY  PMHx (including negatives):  has a past medical history of Diabetes mellitus (HCC).   PSurgHx:  has a past surgical history that includes Cystocopy (09/25/2024).  PSocHx:  reports that he has never smoked. He has never used smokeless tobacco. He reports current alcohol use of about 4.0 standard drinks of alcohol per week. He reports that he does not use drugs.   FamilyHX:   Family History   Problem Relation Age of Onset    Diabetes type 2  Mother        ROS  Review of Systems   All other systems reviewed and are negative.        PHYSICAL EXAM  Physical Exam  Constitutional:       General:

## 2024-11-05 NOTE — PROGRESS NOTES
No chief complaint on file.    1. Have you been to the ER, urgent care clinic since your last visit?  Hospitalized since your last visit?No    2. Have you seen or consulted any other health care providers outside of the [facility] since your last visit?  Include any pap smears or colon screening. No  /71   Pulse 88   Ht 1.803 m (5' 11\")   Wt 91.6 kg (202 lb)   BMI 28.17 kg/m²

## 2024-11-13 ENCOUNTER — TELEPHONE (OUTPATIENT)
Age: 69
End: 2024-11-13

## 2024-11-13 NOTE — TELEPHONE ENCOUNTER
Pt Called Wanting To Know If You Can Follow Up On His Medication Refill For alfuzosin (UROXATRAL) 10 MG extended release tablet . He Said He Called [facility] And For Some Reason They Don't Have His Refill. He Want To Know If You Can Follow Up On It Please.

## 2024-11-14 NOTE — TELEPHONE ENCOUNTER
Called pt to update and got pt prescription card info  [facility]  RXBIN [id number]  RXGRP [id number]  RXPCN [employer]  Member number [id number]

## 2024-11-19 DIAGNOSIS — R39.12 WEAK URINARY STREAM: ICD-10-CM

## 2024-11-19 RX ORDER — ALFUZOSIN HYDROCHLORIDE 10 MG/1
10 TABLET, EXTENDED RELEASE ORAL DAILY
Qty: 30 TABLET | Refills: 1 | Status: SHIPPED | OUTPATIENT
Start: 2024-11-19

## 2025-02-05 ENCOUNTER — OFFICE VISIT (OUTPATIENT)
Age: 70
End: 2025-02-05
Payer: MEDICARE

## 2025-02-05 VITALS — HEART RATE: 83 BPM | DIASTOLIC BLOOD PRESSURE: 81 MMHG | SYSTOLIC BLOOD PRESSURE: 132 MMHG

## 2025-02-05 DIAGNOSIS — R35.0 FREQUENCY OF MICTURITION: ICD-10-CM

## 2025-02-05 DIAGNOSIS — R33.9 URINARY RETENTION: Primary | ICD-10-CM

## 2025-02-05 DIAGNOSIS — R39.12 WEAK URINARY STREAM: ICD-10-CM

## 2025-02-05 DIAGNOSIS — N52.9 ERECTILE DYSFUNCTION, UNSPECIFIED ERECTILE DYSFUNCTION TYPE: ICD-10-CM

## 2025-02-05 DIAGNOSIS — N40.1 BENIGN PROSTATIC HYPERPLASIA WITH LOWER URINARY TRACT SYMPTOMS, SYMPTOM DETAILS UNSPECIFIED: ICD-10-CM

## 2025-02-05 LAB
BILIRUBIN, URINE, POC: NEGATIVE
BLOOD URINE, POC: NEGATIVE
GLUCOSE URINE, POC: 250
KETONES, URINE, POC: NORMAL
LEUKOCYTE ESTERASE, URINE, POC: NEGATIVE
NITRITE, URINE, POC: NEGATIVE
PH, URINE, POC: 5.5 (ref 4.6–8)
PROTEIN,URINE, POC: NORMAL
SPECIFIC GRAVITY, URINE, POC: 1.02 (ref 1–1.03)
URINALYSIS CLARITY, POC: CLEAR
URINALYSIS COLOR, POC: YELLOW
UROBILINOGEN, POC: NORMAL

## 2025-02-05 PROCEDURE — G8428 CUR MEDS NOT DOCUMENT: HCPCS | Performed by: UROLOGY

## 2025-02-05 PROCEDURE — 1123F ACP DISCUSS/DSCN MKR DOCD: CPT | Performed by: UROLOGY

## 2025-02-05 PROCEDURE — 81003 URINALYSIS AUTO W/O SCOPE: CPT | Performed by: UROLOGY

## 2025-02-05 PROCEDURE — 99214 OFFICE O/P EST MOD 30 MIN: CPT | Performed by: UROLOGY

## 2025-02-05 PROCEDURE — G8419 CALC BMI OUT NRM PARAM NOF/U: HCPCS | Performed by: UROLOGY

## 2025-02-05 PROCEDURE — 3017F COLORECTAL CA SCREEN DOC REV: CPT | Performed by: UROLOGY

## 2025-02-05 PROCEDURE — 1036F TOBACCO NON-USER: CPT | Performed by: UROLOGY

## 2025-02-05 RX ORDER — ALFUZOSIN HYDROCHLORIDE 10 MG/1
10 TABLET, EXTENDED RELEASE ORAL 2 TIMES DAILY
Qty: 360 TABLET | Refills: 3 | Status: SHIPPED | OUTPATIENT
Start: 2025-02-05

## 2025-02-05 RX ORDER — TADALAFIL 20 MG/1
10 TABLET ORAL PRN
Qty: 90 TABLET | Refills: 2 | Status: SHIPPED | OUTPATIENT
Start: 2025-02-05 | End: 2025-02-05 | Stop reason: SDUPTHER

## 2025-02-05 RX ORDER — TADALAFIL 20 MG/1
10 TABLET ORAL PRN
Qty: 90 TABLET | Refills: 2 | Status: SHIPPED | OUTPATIENT
Start: 2025-02-05 | End: 2026-02-05

## 2025-02-05 NOTE — PROGRESS NOTES
Chief Complaint   Patient presents with    Follow-up Chronic Condition    Medication Refill        /81 (Site: Left Upper Arm, Position: Sitting, Cuff Size: Large Adult)   Pulse 83      PHQ-9 score is    Negative         No data to display                   1. \"Have you been to the ER, urgent care clinic since your last visit?  Hospitalized since your last visit?\" No    2. \"Have you seen or consulted any other health care providers outside of the [facility] since your last visit?\" No     3. For patients aged 45-75: Has the patient had a colonoscopy / FIT/ Cologuard? Yes - no Care Gap present      If the patient is female:    4. For patients aged 40-74: Has the patient had a mammogram within the past 2 years? NA - based on age or sex      5. For patients aged 21-65: Has the patient had a pap smear? NA - based on age or sex

## 2025-02-05 NOTE — PROGRESS NOTES
HISTORY OF PRESENT ILLNESS  Ole Orona is a 69 y.o. male   Patient returns today with feedback on his medications.  When he takes the Uroxatrol twice a day he is voiding much better.  We also talked about using his Cialis for his frequency.  Now required 10 mg a day to see if that is effective.  We are dealing with insurance company of making sure that he is covered for his alfuzosin twice a day.  Also will check a PSA today to see that we are not dealing with prostate cancer  1. Urinary retention  Overview:  He spontaneously  developed urinary retention which resolved by itself  S/p cystoscopy on 9/25/2024 with findings of BPH with obstruction.   9/25/2024 -PVR was 278 cc  Last appointment possible  urolift discussed     11/5/2024 voids with alfuzosin x2 per day PVR 56 ccc  Orders:  -     AMB POC URINALYSIS DIP STICK AUTO W/O MICRO  -     alfuzosin (UROXATRAL) 10 MG extended release tablet; Take 1 tablet by mouth in the morning and at bedtime, Disp-360 tablet, R-3Normal  -     tadalafil (CIALIS) 20 MG tablet; Take 0.5 tablets by mouth as needed for Erectile Dysfunction Take 1/2 tablet daily for urinary frequency, Disp-90 tablet, R-2Normal  2. Benign prostatic hyperplasia with lower urinary tract symptoms, symptom details unspecified  Overview:  PSA 9/1/23: 0.8  S/p cystoscopy on 9/25/2024 with findings of BPH with obstruction.     9/25/2024 -PVR was 278 cc Urolift discussed     11/5/2024 voids with alfuzosin x2 per day PVR 56 ccc  Orders:  -     AMB POC URINALYSIS DIP STICK AUTO W/O MICRO  -     PSA, Total and Free  3. Erectile dysfunction, unspecified erectile dysfunction type  Overview:  On Cialis.  Orders:  -     AMB POC URINALYSIS DIP STICK AUTO W/O MICRO  4. Frequency of micturition  Overview:  Tx with Alfuzosin  Orders:  -     AMB POC URINALYSIS DIP STICK AUTO W/O MICRO  -     PSA, Total and Free  -     tadalafil (CIALIS) 20 MG tablet; Take 0.5 tablets by mouth as needed for Erectile Dysfunction Take

## 2025-08-11 ENCOUNTER — OFFICE VISIT (OUTPATIENT)
Age: 70
End: 2025-08-11
Payer: MEDICARE

## 2025-08-11 VITALS — HEART RATE: 78 BPM | DIASTOLIC BLOOD PRESSURE: 80 MMHG | SYSTOLIC BLOOD PRESSURE: 138 MMHG

## 2025-08-11 DIAGNOSIS — R35.0 FREQUENCY OF MICTURITION: ICD-10-CM

## 2025-08-11 DIAGNOSIS — R39.12 WEAK URINARY STREAM: ICD-10-CM

## 2025-08-11 DIAGNOSIS — N52.9 ERECTILE DYSFUNCTION, UNSPECIFIED ERECTILE DYSFUNCTION TYPE: ICD-10-CM

## 2025-08-11 DIAGNOSIS — N40.1 BENIGN PROSTATIC HYPERPLASIA WITH LOWER URINARY TRACT SYMPTOMS, SYMPTOM DETAILS UNSPECIFIED: Primary | ICD-10-CM

## 2025-08-11 DIAGNOSIS — R33.9 URINARY RETENTION: ICD-10-CM

## 2025-08-11 DIAGNOSIS — N41.9 PROSTATITIS, UNSPECIFIED PROSTATITIS TYPE: ICD-10-CM

## 2025-08-11 LAB
BILIRUBIN, URINE, POC: NEGATIVE
BLOOD URINE, POC: NEGATIVE
GLUCOSE URINE, POC: 250
KETONES, URINE, POC: POSITIVE
LEUKOCYTE ESTERASE, URINE, POC: NEGATIVE
NITRITE, URINE, POC: NEGATIVE
PH, URINE, POC: 5.5 (ref 4.6–8)
PROTEIN,URINE, POC: NORMAL
PVR, POC: 65 CC
SPECIFIC GRAVITY, URINE, POC: 1.02 (ref 1–1.03)
URINALYSIS CLARITY, POC: CLEAR
URINALYSIS COLOR, POC: YELLOW
UROBILINOGEN, POC: NORMAL

## 2025-08-11 PROCEDURE — G8427 DOCREV CUR MEDS BY ELIG CLIN: HCPCS | Performed by: UROLOGY

## 2025-08-11 PROCEDURE — 1126F AMNT PAIN NOTED NONE PRSNT: CPT | Performed by: UROLOGY

## 2025-08-11 PROCEDURE — 1123F ACP DISCUSS/DSCN MKR DOCD: CPT | Performed by: UROLOGY

## 2025-08-11 PROCEDURE — G8419 CALC BMI OUT NRM PARAM NOF/U: HCPCS | Performed by: UROLOGY

## 2025-08-11 PROCEDURE — 81003 URINALYSIS AUTO W/O SCOPE: CPT | Performed by: UROLOGY

## 2025-08-11 PROCEDURE — 51798 US URINE CAPACITY MEASURE: CPT | Performed by: UROLOGY

## 2025-08-11 PROCEDURE — 3017F COLORECTAL CA SCREEN DOC REV: CPT | Performed by: UROLOGY

## 2025-08-11 PROCEDURE — 99214 OFFICE O/P EST MOD 30 MIN: CPT | Performed by: UROLOGY

## 2025-08-11 PROCEDURE — 1036F TOBACCO NON-USER: CPT | Performed by: UROLOGY

## 2025-08-11 RX ORDER — TADALAFIL 20 MG/1
10 TABLET ORAL PRN
Qty: 90 TABLET | Refills: 2 | Status: SHIPPED | OUTPATIENT
Start: 2025-08-11 | End: 2026-08-11

## 2025-08-11 RX ORDER — ALFUZOSIN HYDROCHLORIDE 10 MG/1
10 TABLET, EXTENDED RELEASE ORAL 2 TIMES DAILY
Qty: 360 TABLET | Refills: 3 | Status: SHIPPED | OUTPATIENT
Start: 2025-08-11

## 2025-08-12 LAB
PSA FREE MFR SERPL: 23.3 %
PSA FREE SERPL-MCNC: 0.21 NG/ML
PSA SERPL-MCNC: 0.9 NG/ML (ref 0–4)

## 2025-08-20 ASSESSMENT — ENCOUNTER SYMPTOMS: EYE DISCHARGE: 1
